# Patient Record
Sex: FEMALE | Race: WHITE | Employment: FULL TIME | URBAN - METROPOLITAN AREA
[De-identification: names, ages, dates, MRNs, and addresses within clinical notes are randomized per-mention and may not be internally consistent; named-entity substitution may affect disease eponyms.]

---

## 2017-11-06 ENCOUNTER — HOSPITAL ENCOUNTER (OUTPATIENT)
Dept: MAMMOGRAPHY | Age: 47
Discharge: HOME | End: 2017-11-06
Attending: OBSTETRICS & GYNECOLOGY
Payer: COMMERCIAL

## 2017-11-06 DIAGNOSIS — Z12.31 VISIT FOR SCREENING MAMMOGRAM: ICD-10-CM

## 2017-11-06 PROCEDURE — 77067 SCR MAMMO BI INCL CAD: CPT

## 2018-07-03 ENCOUNTER — OFFICE VISIT (OUTPATIENT)
Dept: PRIMARY CARE CLINIC | Age: 48
End: 2018-07-03

## 2018-07-03 VITALS
WEIGHT: 293 LBS | HEART RATE: 94 BPM | DIASTOLIC BLOOD PRESSURE: 90 MMHG | HEIGHT: 65 IN | RESPIRATION RATE: 17 BRPM | OXYGEN SATURATION: 94 % | TEMPERATURE: 98 F | SYSTOLIC BLOOD PRESSURE: 148 MMHG | BODY MASS INDEX: 48.82 KG/M2

## 2018-07-03 DIAGNOSIS — J01.00 ACUTE MAXILLARY SINUSITIS, RECURRENCE NOT SPECIFIED: Primary | ICD-10-CM

## 2018-07-03 RX ORDER — BISMUTH SUBSALICYLATE 262 MG
1 TABLET,CHEWABLE ORAL DAILY
COMMUNITY

## 2018-07-03 RX ORDER — FAMOTIDINE 10 MG/1
10 TABLET ORAL 2 TIMES DAILY
COMMUNITY

## 2018-07-03 RX ORDER — AMOXICILLIN AND CLAVULANATE POTASSIUM 875; 125 MG/1; MG/1
1 TABLET, FILM COATED ORAL EVERY 12 HOURS
Qty: 14 TAB | Refills: 0 | Status: SHIPPED | OUTPATIENT
Start: 2018-07-07 | End: 2018-07-14

## 2018-07-03 NOTE — PATIENT INSTRUCTIONS
Saline Nasal Washes: Care Instructions  Your Care Instructions  Saline nasal washes help keep the nasal passages open by washing out thick or dried mucus. This simple remedy can help relieve symptoms of allergies, sinusitis, and colds. It also can make the nose feel more comfortable by keeping the mucous membranes moist. You may notice a little burning sensation in your nose the first few times you use the solution, but this usually gets better in a few days. Follow-up care is a key part of your treatment and safety. Be sure to make and go to all appointments, and call your doctor if you are having problems. It's also a good idea to know your test results and keep a list of the medicines you take. How can you care for yourself at home? · You can buy premixed saline solution in a squeeze bottle or other sinus rinse products at a drugstore. Read and follow the instructions on the label. · You also can make your own saline solution by adding 1 teaspoon of salt and 1 teaspoon of baking soda to 2 cups of distilled water. · If you use a homemade solution, pour a small amount into a clean bowl. Using a rubber bulb syringe, squeeze the syringe and place the tip in the salt water. Pull a small amount of the salt water into the syringe by relaxing your hand. · Sit down with your head tilted slightly back. Do not lie down. Put the tip of the bulb syringe or the squeeze bottle a little way into one of your nostrils. Gently drip or squirt a few drops into the nostril. Repeat with the other nostril. Some sneezing and gagging are normal at first.  · Gently blow your nose. · Wipe the syringe or bottle tip clean after each use. · Repeat this 2 or 3 times a day. · Use nasal washes gently if you have nosebleeds often. When should you call for help? Watch closely for changes in your health, and be sure to contact your doctor if:  ? · You often get nosebleeds. ? · You have problems doing the nasal washes.    Where can you learn more? Go to http://smith-scooby.info/. Enter [id number] in the search box to learn more about \"Saline Nasal Washes: Care Instructions. \"  Current as of: May 12, 2017  Content Version: 11.4  © 5864-6742 [employer]. Care instructions adapted under license by Aud[employer] (which disclaims liability or warranty for this information). If you have questions about a medical condition or this instruction, always ask your healthcare professional. [address] any warranty or liability for your use of this information. Sinusitis: Care Instructions  Your Care Instructions    Sinusitis is an infection of the lining of the sinus cavities in your head. Sinusitis often follows a cold. It causes pain and pressure in your head and face. In most cases, sinusitis gets better on its own in 1 to 2 weeks. But some mild symptoms may last for several weeks. Sometimes antibiotics are needed. Follow-up care is a key part of your treatment and safety. Be sure to make and go to all appointments, and call your doctor if you are having problems. It's also a good idea to know your test results and keep a list of the medicines you take. How can you care for yourself at home? · Take an over-the-counter pain medicine, such as acetaminophen (Tylenol), ibuprofen (Advil, Motrin), or naproxen (Aleve). Read and follow all instructions on the label. · If the doctor prescribed antibiotics, take them as directed. Do not stop taking them just because you feel better. You need to take the full course of antibiotics. · Be careful when taking over-the-counter cold or flu medicines and Tylenol at the same time. Many of these medicines have acetaminophen, which is Tylenol. Read the labels to make sure that you are not taking more than the recommended dose. Too much acetaminophen (Tylenol) can be harmful.   · Breathe warm, moist air from a steamy shower, a hot bath, or a sink filled with hot water. Avoid cold, dry air. Using a humidifier in your home may help. Follow the directions for cleaning the machine. · Use saline (saltwater) nasal washes to help keep your nasal passages open and wash out mucus and bacteria. You can buy saline nose drops at a grocery store or drugstore. Or you can make your own at home by adding 1 teaspoon of salt and 1 teaspoon of baking soda to 2 cups of distilled water. If you make your own, fill a bulb syringe with the solution, insert the tip into your nostril, and squeeze gently. Lorrin Fraction your nose. · Put a hot, wet towel or a warm gel pack on your face 3 or 4 times a day for 5 to 10 minutes each time. · Try a decongestant nasal spray like oxymetazoline (Afrin). Do not use it for more than 3 days in a row. Using it for more than 3 days can make your congestion worse. When should you call for help? Call your doctor now or seek immediate medical care if:  ? · You have new or worse swelling or redness in your face or around your eyes. ? · You have a new or higher fever. ? Watch closely for changes in your health, and be sure to contact your doctor if:  ? · You have new or worse facial pain. ? · The mucus from your nose becomes thicker (like pus) or has new blood in it. ? · You are not getting better as expected. Where can you learn more? Go to http://smith-scooby.info/. Enter S588 in the search box to learn more about \"Sinusitis: Care Instructions. \"  Current as of: May 12, 2017  Content Version: 11.4  © 7132-0076 [employer]. Care instructions adapted under license by [employer] (which disclaims liability or warranty for this information). If you have questions about a medical condition or this instruction, always ask your healthcare professional. [address] any warranty or liability for your use of this information.

## 2018-07-03 NOTE — PROGRESS NOTES
Chief Complaint   Patient presents with    Nasal Congestion   pt c/o nasal congestion, post nasal drip, sinus pressure and coughing since last week, pt states she has taken otc Dayquil,Nyquil and zyrtec to help with discomfort. This note will not be viewable in [address]

## 2018-07-03 NOTE — MR AVS SNAPSHOT
Michelle Doyle 
 
 
 [address] [place] [address] 83. 
[phone] Patient: Dianne Patel MRN: [id number] :1970 Visit Information Date & Time Provider Department Dept. Phone Encounter #  
 7/3/2018  2:00 PM Margaux [facility] [id number] Upcoming Health Maintenance Date Due DTaP/Tdap/Td series (1 - Tdap) 1989 PAP AKA CERVICAL CYTOLOGY 1989 Influenza Age 5 to Adult 2018 Allergies as of 7/3/2018  Review Complete On: 7/3/2018 By: Margaux Hollingsworth MD  
 No Known Allergies Current Immunizations  Never Reviewed No immunizations on file. Not reviewed this visit You Were Diagnosed With   
  
 Codes Comments Acute maxillary sinusitis, recurrence not specified    -  Primary ICD-10-CM: J01.00 ICD-9-CM: 461.0 Vitals BP Pulse Temp Resp Height(growth percentile) Weight(growth percentile) 148/90 (BP 1 Location: Left arm, BP Patient Position: Sitting) 94 98 °F (36.7 °C) (Oral) 17 5' 5\" (1.651 m) 342 lb 3.2 oz (155.2 kg) LMP SpO2 BMI OB Status Smoking Status 2018 94% 56.95 kg/m2 Having regular periods Never Smoker Vitals History BMI and BSA Data Body Mass Index Body Surface Area 56.95 kg/m 2 2.67 m 2 Preferred Pharmacy Pharmacy Name Phone [phone] [phone] Your Updated Medication List  
  
   
This list is accurate as of 7/3/18  2:12 PM.  Always use your most recent med list.  
  
  
  
  
 amoxicillin-clavulanate 875-125 mg per tablet Commonly known as:  AUGMENTIN Take 1 Tab by mouth every twelve (12) hours for 7 days. Start taking on:  2018 CALCIUM WITH VITAMIN D PO Take 1 Tab by mouth daily. GLUCOSAMINE CHONDROITIN PLUS PO Take 1 Tab by mouth daily. multivitamin tablet Commonly known as:  ONE A DAY  
 Take 1 Tab by mouth daily. PEPCID AC 10 mg tablet Generic drug:  famotidine Take 10 mg by mouth two (2) times a day. Prescriptions Sent to Pharmacy Refills  
 amoxicillin-clavulanate (AUGMENTIN) 875-125 mg per tablet 0 Starting on: 7/7/2018 Sig: Take 1 Tab by mouth every twelve (12) hours for 7 days. Class: Normal  
 [facility] [place] [address] #: [phone] Route: Oral  
  
Patient Instructions Saline Nasal Washes: Care Instructions Your Care Instructions Saline nasal washes help keep the nasal passages open by washing out thick or dried mucus. This simple remedy can help relieve symptoms of allergies, sinusitis, and colds. It also can make the nose feel more comfortable by keeping the mucous membranes moist. You may notice a little burning sensation in your nose the first few times you use the solution, but this usually gets better in a few days. Follow-up care is a key part of your treatment and safety. Be sure to make and go to all appointments, and call your doctor if you are having problems. It's also a good idea to know your test results and keep a list of the medicines you take. How can you care for yourself at home? · You can buy premixed saline solution in a squeeze bottle or other sinus rinse products at a drugstore. Read and follow the instructions on the label. · You also can make your own saline solution by adding 1 teaspoon of salt and 1 teaspoon of baking soda to 2 cups of distilled water. · If you use a homemade solution, pour a small amount into a clean bowl. Using a rubber bulb syringe, squeeze the syringe and place the tip in the salt water. Pull a small amount of the salt water into the syringe by relaxing your hand. · Sit down with your head tilted slightly back. Do not lie down.  Put the tip of the bulb syringe or the squeeze bottle a little way into one of your nostrils. Gently drip or squirt a few drops into the nostril. Repeat with the other nostril. Some sneezing and gagging are normal at first. 
· Gently blow your nose. · Wipe the syringe or bottle tip clean after each use. · Repeat this 2 or 3 times a day. · Use nasal washes gently if you have nosebleeds often. When should you call for help? Watch closely for changes in your health, and be sure to contact your doctor if: 
? · You often get nosebleeds. ? · You have problems doing the nasal washes. Where can you learn more? Go to http://smith-scooby.info/. Enter [id number] in the search box to learn more about \"Saline Nasal Washes: Care Instructions. \" Current as of: May 12, 2017 Content Version: 11.4 © 3573-5395 [employer]. Care instructions adapted under license by Net[employer] (which disclaims liability or warranty for this information). If you have questions about a medical condition or this instruction, always ask your healthcare professional. [address] any warranty or liability for your use of this information. Sinusitis: Care Instructions Your Care Instructions Sinusitis is an infection of the lining of the sinus cavities in your head. Sinusitis often follows a cold. It causes pain and pressure in your head and face. In most cases, sinusitis gets better on its own in 1 to 2 weeks. But some mild symptoms may last for several weeks. Sometimes antibiotics are needed. Follow-up care is a key part of your treatment and safety. Be sure to make and go to all appointments, and call your doctor if you are having problems. It's also a good idea to know your test results and keep a list of the medicines you take. How can you care for yourself at home? · Take an over-the-counter pain medicine, such as acetaminophen (Tylenol), ibuprofen (Advil, Motrin), or naproxen (Aleve). Read and follow all instructions on the label. · If the doctor prescribed antibiotics, take them as directed. Do not stop taking them just because you feel better. You need to take the full course of antibiotics. · Be careful when taking over-the-counter cold or flu medicines and Tylenol at the same time. Many of these medicines have acetaminophen, which is Tylenol. Read the labels to make sure that you are not taking more than the recommended dose. Too much acetaminophen (Tylenol) can be harmful. · Breathe warm, moist air from a steamy shower, a hot bath, or a sink filled with hot water. Avoid cold, dry air. Using a humidifier in your home may help. Follow the directions for cleaning the machine. · Use saline (saltwater) nasal washes to help keep your nasal passages open and wash out mucus and bacteria. You can buy saline nose drops at a grocery store or drugstore. Or you can make your own at home by adding 1 teaspoon of salt and 1 teaspoon of baking soda to 2 cups of distilled water. If you make your own, fill a bulb syringe with the solution, insert the tip into your nostril, and squeeze gently. [place] your nose. · Put a hot, wet towel or a warm gel pack on your face 3 or 4 times a day for 5 to 10 minutes each time. · Try a decongestant nasal spray like oxymetazoline (Afrin). Do not use it for more than 3 days in a row. Using it for more than 3 days can make your congestion worse. When should you call for help? Call your doctor now or seek immediate medical care if: 
? · You have new or worse swelling or redness in your face or around your eyes. ? · You have a new or higher fever. ? Watch closely for changes in your health, and be sure to contact your doctor if: 
? · You have new or worse facial pain. ? · The mucus from your nose becomes thicker (like pus) or has new blood in it. ? · You are not getting better as expected. Where can you learn more? Go to http://smith-scooby.info/. Enter O895 in the search box to learn more about \"Sinusitis: Care Instructions. \" Current as of: May 12, 2017 Content Version: 11.4 © 8979-4266 [employer]. Care instructions adapted under license by Mob[employer] (which disclaims liability or warranty for this information). If you have questions about a medical condition or this instruction, always ask your healthcare professional. [address] any warranty or liability for your use of this information. Introducing [address]! Dear [employer]: Thank you for requesting a [employer] account. Our records indicate that you already have an active [employer] account. You can access your account anytime at [email or web address] Did you know that you can access your hospital and ER discharge instructions at any time in [employer]? You can also review all of your test results from your hospital stay or ER visit. Additional Information If you have questions, please visit the Frequently Asked Questions section of the [employer] website at [email or web address] Remember, [employer] is NOT to be used for urgent needs. For medical emergencies, dial 911. Now available from your iPhone and Android! Please provide this summary of care documentation to your next provider. Your primary care clinician is listed as [address] [place] [address]. If you have any questions after today's visit, please call [phone].

## 2018-07-03 NOTE — PROGRESS NOTES
Subjective:      Bhanu Billings is a 50 y.o. female who presents for evaluation of possible sinus infection. Nasal congestion and sinus pressure x4 days. No fever or chills. Cloudy nasal drainage, cough is mildly productive. Scratchy throat and bilateral ear pressure. She has tried dayquil, nyquil, zyrtec, afrin nasal spray. History reviewed. No pertinent past medical history. Family History   Problem Relation Age of Onset    Breast Cancer Mother 67       No Known Allergies  Social History     Social History    Marital status:      Spouse name: N/A    Number of children: N/A    Years of education: N/A     Occupational History    Not on file. Social History Main Topics    Smoking status: Never Smoker    Smokeless tobacco: Never Used    Alcohol use No    Drug use: No    Sexual activity: Yes     Other Topics Concern    Not on file     Social History Narrative       Review of Systems   Constitutional: Negative for chills and fever. HENT: Positive for congestion and sinus pain. Negative for ear pain and sore throat. Eyes: Negative for blurred vision, double vision, photophobia and pain. Respiratory: Positive for cough. Negative for shortness of breath and stridor. Cardiovascular: Negative for chest pain. Gastrointestinal: Negative for abdominal pain, diarrhea, nausea and vomiting. Skin: Negative for rash. Neurological: Negative for tingling, sensory change, focal weakness and headaches. Objective:     Visit Vitals    /90 (BP 1 Location: Left arm, BP Patient Position: Sitting)    Pulse 94    Temp 98 °F (36.7 °C) (Oral)    Resp 17    Ht 5' 5\" (1.651 m)    Wt 342 lb 3.2 oz (155.2 kg)    LMP 06/30/2018    SpO2 94%    BMI 56.95 kg/m2     General: Alert and oriented and in no acute distress. Responds to all questions appropriately. SKIN: No rash. HEAD: bilaterally maxillary sinus tenderness.   EYES: Sclera and conjunctiva clear; pupils round and reactive to light.  EARS: External normal, canals clear, tympanic membranes normal.     NOSE: Edema and erythema of the turbinates with clear mucous drainage. OROPHARYNX: Slight tonsil edema and erythema with no exudate. NECK: Supple; no masses; normal lymphadenopathy. LUNGS: Clear to auscultation bilaterally, no wheeze, rales and rhonchi. CARDIOVASCULAR: Regular, rate, and rhythm without murmurs, gallops or rubs. NEUROLOGIC: Speech intact; face symmetrical; moves all extremities equally. Assessment:       ICD-10-CM ICD-9-CM    1. Acute maxillary sinusitis, recurrence not specified J01.00 461.0 amoxicillin-clavulanate (AUGMENTIN) 875-125 mg per tablet     Likely viral. Advised to continue OTCs, if febrile or not improving in 3-4 days, may start augmentin. Return PRN. Plan:     1. Nasal saline rinses as needed for congestion. 2. Follow-up  in 7 days  if symptoms worsen or persist.  3. Over-the-counter mediciations:    1. Ibuprofen (Motrin, Advil): 200mg - take 1-4 three times a day for 5 days. -OR-    Naproxin (Aleve): 220mg 1-2 tablets twice a day for 5 days. 2. Acetaminophen (Tylenol): 500mg 1-2 tablets every 6 hours as needed for pain. 3. Combination cough and decongestant medicine such as Mucinex D.  4. Theraflu  5. Augmentin     This note will not be viewable in MyChart.

## 2018-08-27 ENCOUNTER — OFFICE VISIT (OUTPATIENT)
Dept: SURGERY | Age: 48
End: 2018-08-27

## 2018-08-27 VITALS
DIASTOLIC BLOOD PRESSURE: 100 MMHG | HEIGHT: 65 IN | HEART RATE: 94 BPM | BODY MASS INDEX: 48.82 KG/M2 | WEIGHT: 293 LBS | SYSTOLIC BLOOD PRESSURE: 175 MMHG

## 2018-08-27 DIAGNOSIS — Z15.02 MONOALLELIC MUTATION OF CHEK2 GENE IN FEMALE PATIENT: Primary | ICD-10-CM

## 2018-08-27 DIAGNOSIS — Z15.09 MONOALLELIC MUTATION OF CHEK2 GENE IN FEMALE PATIENT: Primary | ICD-10-CM

## 2018-08-27 DIAGNOSIS — Z80.41 FAMILY HISTORY OF OVARIAN CANCER: ICD-10-CM

## 2018-08-27 DIAGNOSIS — Z91.89 AT HIGH RISK FOR BREAST CANCER: ICD-10-CM

## 2018-08-27 DIAGNOSIS — Z80.3 FAMILY HISTORY OF BREAST CANCER: ICD-10-CM

## 2018-08-27 DIAGNOSIS — Z15.01 MONOALLELIC MUTATION OF CHEK2 GENE IN FEMALE PATIENT: Primary | ICD-10-CM

## 2018-08-27 DIAGNOSIS — Z15.89 MONOALLELIC MUTATION OF CHEK2 GENE IN FEMALE PATIENT: Primary | ICD-10-CM

## 2018-08-27 PROBLEM — E66.01 OBESITY, MORBID (HCC): Status: ACTIVE | Noted: 2018-08-27

## 2018-08-27 NOTE — PROGRESS NOTES
HISTORY OF PRESENT ILLNESS  Saintclair Lemons is a 50 y.o. female. HPI  NEW patient consult referred by Dr. Saulo Forbes for CHEK2 mutation. Patient here to talk about care plan for genetic mutation and high risk family history. Denies palpable lumps, skin changes, or nipple discharge/retraction. No pain. Patient has a history of melanoma at age 30. FH:  Mother had breast cancer at 79. Maternal grandmother  from ovarian cancer at 48. Review of Systems   Constitutional: Negative. HENT: Negative. Eyes: Negative. Respiratory: Negative. Cardiovascular: Negative. Gastrointestinal: Negative. Genitourinary: Negative. Musculoskeletal: Negative. Skin: Negative. Neurological: Negative. Endo/Heme/Allergies: Negative. Psychiatric/Behavioral: Negative.         Physical Exam    ASSESSMENT and PLAN  {ASSESSMENT/PLAN:03559}

## 2018-08-27 NOTE — COMMUNICATION BODY
HISTORY OF PRESENT ILLNESS  Seema Calero is a 50 y.o. female. HPI  NEW patient consult referred by Dr. Toby Tristan for CHEK2 mutation. Patient here to talk about care plan for genetic mutation and high risk family history. Denies palpable lumps, skin changes, or nipple discharge/retraction. No pain.      Patient has a history of melanoma at age 30.      FH: Mother had breast cancer at 79. Maternal grandmother  from ovarian cancer at 48. Past Medical History:   Diagnosis Date    Cancer [facility])     melanoma       No past surgical history on file. Social History     Social History    Marital status:      Spouse name: N/A    Number of children: N/A    Years of education: N/A     Occupational History    Not on file. Social History Main Topics    Smoking status: Never Smoker    Smokeless tobacco: Never Used    Alcohol use 1.8 oz/week     2 Glasses of wine, 1 Cans of beer per week    Drug use: No    Sexual activity: Yes     Other Topics Concern    Not on file     Social History Narrative       Current Outpatient Prescriptions on File Prior to Visit   Medication Sig Dispense Refill    multivitamin (ONE A DAY) tablet Take 1 Tab by mouth daily.  famotidine (PEPCID AC) 10 mg tablet Take 10 mg by mouth two (2) times a day.  calcium carbonate/vitamin D3 (CALCIUM WITH VITAMIN D PO) Take 1 Tab by mouth daily.  gluc/chnd/om3/dha/epa/fish/str (GLUCOSAMINE CHONDROITIN PLUS PO) Take 1 Tab by mouth daily. No current facility-administered medications on file prior to visit. No Known Allergies    OB History      Para Term  AB Living    1         SAB TAB Ectopic Molar Multiple Live Births        1 2        Obstetric Comments    Menarche 13, LMP 16, # of children 2, age of 4st delivery 27, Hysterectomy/oophorectomy no/no, Breast bx no, history of breast feeding yes, BCP no, Hormone therapy no        ROS  Constitutional: Negative. HENT: Negative. Eyes: Negative. Respiratory: Negative. Cardiovascular: Negative. Gastrointestinal: Negative. Genitourinary: Negative. Musculoskeletal: Negative. Skin: Negative. Neurological: Negative. Endo/Heme/Allergies: Negative. Psychiatric/Behavioral: Negative. Physical Exam   Cardiovascular: Normal rate, regular rhythm and normal heart sounds. Pulmonary/Chest: Breath sounds normal. Right breast exhibits no inverted nipple, no mass, no nipple discharge, no skin change and no tenderness. Left breast exhibits no inverted nipple, no mass, no nipple discharge, no skin change and no tenderness. Breasts are symmetrical.   Lymphadenopathy:        Right cervical: No superficial cervical, no deep cervical and no posterior cervical adenopathy present. Left cervical: No superficial cervical, no deep cervical and no posterior cervical adenopathy present. She has no axillary adenopathy. Right axillary: No pectoral and no lateral adenopathy present. Left axillary: No pectoral and no lateral adenopathy present. BREAST ULTRASOUND  Indication: Right  breast mass LOQ  Technique: The area was scanned using a high-frequency linear-array near-field transducer  Findings: No abnormal mass, lesion, or shadowing noted. No cysts  Impression: Normal breast tissue   Disposition: No worrisome finding on ultrasound    ASSESSMENT and PLAN    ICD-10-CM ICD-9-CM    1. Monoallelic mutation of CHEK2 gene in female patient Z15.01 V84.01     Z15.89 V84.09     Z15.09 V84.89     Z15.02 V84.02    2. At high risk for breast cancer Z91.89 V49.89    3. Family history of breast cancer Z80.3 V16.3    4. Family history of ovarian cancer Z80.41 V13.42       New patient presents for consultation about CHEK2 mutation and high risk family history. Thick area on exam at RIGHT breast LOQ. No worrisome findings on US.     Discussed CHEK2 mutation and family hx of breast cancer, and elevated risk of breast cancer at approx. 30-35%. Options include observation with yearly MRI and mammo imaging, drug treatment, or prophylactic BL mastectomy. Also at slightly increased risk for ovarian cancer - pt to discuss pelvic US with her OB-GYN provider. Assured pt that she does not need to have oophorectomy or hysterectomy at this time. Also discussed importance of healthy diet with fruits and vegetables, and exercise 3 hours/week to help reduce risk of breast cancer. Pt also notes that she plans to stagger her imaging and provider appointments every 3 months. No action recommended for pt's 24year-old daughters at this time, except for normal surveillance. Will order screening MRI and f/u with the results. F/U in 1 year. This plan was reviewed with the patient and patient agrees. All questions were answered.     Written by Wade Ogden, as dictated by Dr. Luis M Posada MD.

## 2018-08-27 NOTE — LETTER
2018 2:56 PM 
 
Patient:  Mago Barlow YOB: 1970 Date of Visit: 2018 Dear Dr. Khalida Jovel: Thank you for referring Ms. Myrna Teresa to me for evaluation/treatment. Below are the relevant portions of my assessment and plan of care. HISTORY OF PRESENT ILLNESS Mago Barlow is a 50 y.o. female. HPI 
NEW patient consult referred by Dr. Susana Bond for CHEK2 mutation. Patient here to talk about care plan for genetic mutation and high risk family history. Denies palpable lumps, skin changes, or nipple discharge/retraction. No pain.  
  
Patient has a history of melanoma at age 30.  
  
FH: Mother had breast cancer at 79. Maternal grandmother  from ovarian cancer at 48. Past Medical History:  
Diagnosis Date  Cancer ([facility] [address] melanoma No past surgical history on file. Social History Social History  Marital status:  Spouse name: N/A  
 Number of children: N/A  
 Years of education: N/A Occupational History  Not on file. Social History Main Topics  Smoking status: Never Smoker  Smokeless tobacco: Never Used  Alcohol use 1.8 oz/week 2 Glasses of wine, 1 Cans of beer per week  Drug use: No  
 Sexual activity: Yes Other Topics Concern  Not on file Social History Narrative Current Outpatient Prescriptions on File Prior to Visit Medication Sig Dispense Refill  multivitamin (ONE A DAY) tablet Take 1 Tab by mouth daily.  famotidine (PEPCID AC) 10 mg tablet Take 10 mg by mouth two (2) times a day.  calcium carbonate/vitamin D3 (CALCIUM WITH VITAMIN D PO) Take 1 Tab by mouth daily.  gluc/chnd/om3/dha/epa/fish/str (GLUCOSAMINE CHONDROITIN PLUS PO) Take 1 Tab by mouth daily. No current facility-administered medications on file prior to visit. No Known Allergies OB History  Para Term  AB Living  1       
 SAB TAB Ectopic Molar Multiple Live Births 1 2 Obstetric Comments Menarche 13, LMP 8/1/18, # of children 2, age of 1st delivery 27, Hysterectomy/oophorectomy no/no, Breast bx no, history of breast feeding yes, BCP no, Hormone therapy no ROS Constitutional: Negative. HENT: Negative. Eyes: Negative. Respiratory: Negative. Cardiovascular: Negative. Gastrointestinal: Negative. Genitourinary: Negative. Musculoskeletal: Negative. Skin: Negative. Neurological: Negative. Endo/Heme/Allergies: Negative. Psychiatric/Behavioral: Negative. Physical Exam  
Cardiovascular: Normal rate, regular rhythm and normal heart sounds. Pulmonary/Chest: Breath sounds normal. Right breast exhibits no inverted nipple, no mass, no nipple discharge, no skin change and no tenderness. Left breast exhibits no inverted nipple, no mass, no nipple discharge, no skin change and no tenderness. Breasts are symmetrical.  
Lymphadenopathy:  
     Right cervical: No superficial cervical, no deep cervical and no posterior cervical adenopathy present. Left cervical: No superficial cervical, no deep cervical and no posterior cervical adenopathy present. She has no axillary adenopathy. Right axillary: No pectoral and no lateral adenopathy present. Left axillary: No pectoral and no lateral adenopathy present. BREAST ULTRASOUND Indication: Right  breast mass LOQ Technique: The area was scanned using a high-frequency linear-array near-field transducer Findings: No abnormal mass, lesion, or shadowing noted. No cysts Impression: Normal breast tissue Disposition: No worrisome finding on ultrasound ASSESSMENT and PLAN 
  ICD-10-CM ICD-9-CM 1. Monoallelic mutation of CHEK2 gene in female patient Z15.01 V84.01   
 Z15.89 V84.09   
 Z15.09 V84.89   
 Z15.02 V84.02   
2. At high risk for breast cancer Z91.89 V49.89   
3. Family history of breast cancer Z80.3 V16.3 4. Family history of ovarian cancer Z80.41 V16.41 New patient presents for consultation about CHEK2 mutation and high risk family history. Thick area on exam at RIGHT breast LOQ. No worrisome findings on US. Discussed CHEK2 mutation and family hx of breast cancer, and elevated risk of breast cancer at approx. 30-35%. Options include observation with yearly MRI and mammo imaging, drug treatment, or prophylactic BL mastectomy. Also at slightly increased risk for ovarian cancer - pt to discuss pelvic US with her OB-GYN provider. Assured pt that she does not need to have oophorectomy or hysterectomy at this time. Also discussed importance of healthy diet with fruits and vegetables, and exercise 3 hours/week to help reduce risk of breast cancer. Pt also notes that she plans to stagger her imaging and provider appointments every 3 months. No action recommended for pt's 24year-old daughters at this time, except for normal surveillance. Will order screening MRI and f/u with the results. F/U in 1 year. This plan was reviewed with the patient and patient agrees. All questions were answered. Written by Catrachito Reyes, as dictated by Dr. Erlinda Stratton MD. 
 
 
If you have questions, please do not hesitate to call me. I look forward to following Ms. Silveira along with you.  
 
 
 
Sincerely, 
 
 
Sixto Escoto MD

## 2018-08-27 NOTE — MR AVS SNAPSHOT
Nila Irvin 
 
 
 [facility] [address] [phone] Patient: Zilphia Mohs MRN: [id number] :1970 Visit Information Date & Time Provider Department Dept. Phone Encounter #  
 2018 74:66 AM Beulah Carlson MD [facility] [phone] [id number] Follow-up Instructions Return in about 1 year (around 2019) for with mammogram.  
 Follow-up and Disposition History Upcoming Health Maintenance Date Due DTaP/Tdap/Td series (1 - Tdap) 1989 PAP AKA CERVICAL CYTOLOGY 1989 Influenza Age 5 to Adult 2018 Allergies as of 2018  Review Complete On:  By: Beulah Carlson MD  
 No Known Allergies Current Immunizations  Never Reviewed No immunizations on file. Not reviewed this visit You Were Diagnosed With   
  
 Codes Comments Monoallelic mutation of CHEK2 gene in female patient    -  Primary ICD-10-CM: Z15.01, Z15.89, Z15.09, Z15.02 
ICD-9-CM: V84.01, V84.09, V84.89, V84.02 At high risk for breast cancer     ICD-10-CM: Z91.89 ICD-9-CM: V49.89 Family history of breast cancer     ICD-10-CM: Z80.3 ICD-9-CM: V16.3 Family history of ovarian cancer     ICD-10-CM: Z80.41 ICD-9-CM: V16.41 Vitals BP Pulse Height(growth percentile) Weight(growth percentile) BMI OB Status (!) 175/100 94 5' 5\" (1.651 m) 327 lb (148.3 kg) 54.42 kg/m2 Having regular periods Smoking Status Never Smoker BMI and BSA Data Body Mass Index Body Surface Area 54.42 kg/m 2 2.61 m 2 Preferred Pharmacy Pharmacy Name Phone [id number] [phone] Your Updated Medication List  
  
   
This list is accurate as of 18  4:03 PM.  Always use your most recent med list.  
  
  
  
  
 CALCIUM WITH VITAMIN D PO  
 Take 1 Tab by mouth daily. GLUCOSAMINE CHONDROITIN PLUS PO Take 1 Tab by mouth daily. multivitamin tablet Commonly known as:  ONE A DAY Take 1 Tab by mouth daily. PEPCID AC 10 mg tablet Generic drug:  famotidine Take 10 mg by mouth two (2) times a day. Follow-up Instructions Return in about 1 year (around 8/27/2019) for with mammogram.  
  
To-Do List   
 08/27/2018 Imaging:  MRI BREAST BI W WO CONT Patient Instructions Breast Self-Exam: Care Instructions Your Care Instructions A breast self-exam is when you check your breasts for lumps or changes. This regular exam helps you learn how your breasts normally look and feel. Most breast problems or changes are not because of cancer. Breast self-exam is not a substitute for a mammogram. Having regular breast exams by your doctor and regular mammograms improve your chances of finding any problems with your breasts. Some women set a time each month to do a step-by-step breast self-exam. Other women like a less formal system. They might look at their breasts as they brush their teeth, or feel their breasts once in a while in the shower. If you notice a change in your breast, tell your doctor. Follow-up care is a key part of your treatment and safety. Be sure to make and go to all appointments, and call your doctor if you are having problems. It's also a good idea to know your test results and keep a list of the medicines you take. How do you do a breast self-exam? 
· The best time to examine your breasts is usually one week after your menstrual period begins. Your breasts should not be tender then. If you do not have periods, you might do your exam on a day of the month that is easy to remember. · To examine your breasts: ¨ Remove all your clothes above the waist and lie down.  When you are lying down, your breast tissue spreads evenly over your chest wall, which makes it easier to feel all your breast tissue. ¨ Use the pads-not the fingertips-of the 3 middle fingers of your left hand to check your right breast. Move your fingers slowly in small coin-sized circles that overlap. ¨ Use three levels of pressure to feel of all your breast tissue. Use light pressure to feel the tissue close to the skin surface. Use medium pressure to feel a little deeper. Use firm pressure to feel your tissue close to your breastbone and ribs. Use each pressure level to feel your breast tissue before moving on to the next spot. ¨ Check your entire breast, moving up and down as if following a strip from the collarbone to the bra line, and from the armpit to the ribs. Repeat until you have covered the entire breast. 
¨ Repeat this procedure for your left breast, using the pads of the 3 middle fingers of your right hand. · To examine your breasts while in the shower: 
¨ Place one arm over your head and lightly soap your breast on that side. ¨ Using the pads of your fingers, gently move your hand over your breast (in the strip pattern described above), feeling carefully for any lumps or changes. ¨ Repeat for the other breast. 
· Have your doctor inspect anything you notice to see if you need further testing. Where can you learn more? Go to http://smith-scooby.info/. Enter P148 in the search box to learn more about \"Breast Self-Exam: Care Instructions. \" Current as of: May 12, 2017 Content Version: 11.7 © 2480-2130 [employer]. Care instructions adapted under license by [employer] (which disclaims liability or warranty for this information). If you have questions about a medical condition or this instruction, always ask your healthcare professional. [place] [address] any warranty or liability for your use of this information. Introducing [facility] & HEALTH SERVICES! Dear Clotilde Hawthorne: Thank you for requesting a [employer] account. Our records indicate that you already have an active [employer] account. You can access your account anytime at [email or web address] Did you know that you can access your hospital and ER discharge instructions at any time in [employer]? You can also review all of your test results from your hospital stay or ER visit. Additional Information If you have questions, please visit the Frequently Asked Questions section of the [employer] website at [email or web address] Remember, [employer] is NOT to be used for urgent needs. For medical emergencies, dial 911. Now available from your iPhone and Android! Please provide this summary of care documentation to your next provider. Your primary care clinician is listed as [address] [place] [address]. If you have any questions after today's visit, please call [phone].

## 2018-08-27 NOTE — PATIENT INSTRUCTIONS
Breast Self-Exam: Care Instructions  Your Care Instructions    A breast self-exam is when you check your breasts for lumps or changes. This regular exam helps you learn how your breasts normally look and feel. Most breast problems or changes are not because of cancer. Breast self-exam is not a substitute for a mammogram. Having regular breast exams by your doctor and regular mammograms improve your chances of finding any problems with your breasts. Some women set a time each month to do a step-by-step breast self-exam. Other women like a less formal system. They might look at their breasts as they brush their teeth, or feel their breasts once in a while in the shower. If you notice a change in your breast, tell your doctor. Follow-up care is a key part of your treatment and safety. Be sure to make and go to all appointments, and call your doctor if you are having problems. It's also a good idea to know your test results and keep a list of the medicines you take. How do you do a breast self-exam?  · The best time to examine your breasts is usually one week after your menstrual period begins. Your breasts should not be tender then. If you do not have periods, you might do your exam on a day of the month that is easy to remember. · To examine your breasts:  ¨ Remove all your clothes above the waist and lie down. When you are lying down, your breast tissue spreads evenly over your chest wall, which makes it easier to feel all your breast tissue. ¨ Use the pads-not the fingertips-of the 3 middle fingers of your left hand to check your right breast. Move your fingers slowly in small coin-sized circles that overlap. ¨ Use three levels of pressure to feel of all your breast tissue. Use light pressure to feel the tissue close to the skin surface. Use medium pressure to feel a little deeper. Use firm pressure to feel your tissue close to your breastbone and ribs.  Use each pressure level to feel your breast tissue before moving on to the next spot. ¨ Check your entire breast, moving up and down as if following a strip from the collarbone to the bra line, and from the armpit to the ribs. Repeat until you have covered the entire breast.  ¨ Repeat this procedure for your left breast, using the pads of the 3 middle fingers of your right hand. · To examine your breasts while in the shower:  ¨ Place one arm over your head and lightly soap your breast on that side. ¨ Using the pads of your fingers, gently move your hand over your breast (in the strip pattern described above), feeling carefully for any lumps or changes. ¨ Repeat for the other breast.  · Have your doctor inspect anything you notice to see if you need further testing. Where can you learn more? Go to http://smith-scooby.info/. Enter P148 in the search box to learn more about \"Breast Self-Exam: Care Instructions. \"  Current as of: May 12, 2017  Content Version: 11.7  © 5574-4850 [employer]. Care instructions adapted under license by [employer] (which disclaims liability or warranty for this information). If you have questions about a medical condition or this instruction, always ask your healthcare professional. [place] [address] any warranty or liability for your use of this information.

## 2018-08-27 NOTE — PROGRESS NOTES
HISTORY OF PRESENT ILLNESS  Rosalia Nguyen is a 50 y.o. female. HPI  NEW patient consult referred by Dr. Shahida Liriano for CHEK2 mutation. Patient here to talk about care plan for genetic mutation and high risk family history. Denies palpable lumps, skin changes, or nipple discharge/retraction. No pain.      Patient has a history of melanoma at age 30.      FH: Mother had breast cancer at 79. Maternal grandmother  from ovarian cancer at 48. Past Medical History:   Diagnosis Date    Cancer [facility])     melanoma       No past surgical history on file. Social History     Social History    Marital status:      Spouse name: N/A    Number of children: N/A    Years of education: N/A     Occupational History    Not on file. Social History Main Topics    Smoking status: Never Smoker    Smokeless tobacco: Never Used    Alcohol use 1.8 oz/week     2 Glasses of wine, 1 Cans of beer per week    Drug use: No    Sexual activity: Yes     Other Topics Concern    Not on file     Social History Narrative       Current Outpatient Prescriptions on File Prior to Visit   Medication Sig Dispense Refill    multivitamin (ONE A DAY) tablet Take 1 Tab by mouth daily.  famotidine (PEPCID AC) 10 mg tablet Take 10 mg by mouth two (2) times a day.  calcium carbonate/vitamin D3 (CALCIUM WITH VITAMIN D PO) Take 1 Tab by mouth daily.  gluc/chnd/om3/dha/epa/fish/str (GLUCOSAMINE CHONDROITIN PLUS PO) Take 1 Tab by mouth daily. No current facility-administered medications on file prior to visit. No Known Allergies    OB History      Para Term  AB Living    1         SAB TAB Ectopic Molar Multiple Live Births        1 2        Obstetric Comments    Menarche 13, LMP 16, # of children 2, age of 4st delivery 27, Hysterectomy/oophorectomy no/no, Breast bx no, history of breast feeding yes, BCP no, Hormone therapy no        ROS  Constitutional: Negative. HENT: Negative. Eyes: Negative. Respiratory: Negative. Cardiovascular: Negative. Gastrointestinal: Negative. Genitourinary: Negative. Musculoskeletal: Negative. Skin: Negative. Neurological: Negative. Endo/Heme/Allergies: Negative. Psychiatric/Behavioral: Negative. Physical Exam   Cardiovascular: Normal rate, regular rhythm and normal heart sounds. Pulmonary/Chest: Breath sounds normal. Right breast exhibits no inverted nipple, no mass, no nipple discharge, no skin change and no tenderness. Left breast exhibits no inverted nipple, no mass, no nipple discharge, no skin change and no tenderness. Breasts are symmetrical.   Lymphadenopathy:        Right cervical: No superficial cervical, no deep cervical and no posterior cervical adenopathy present. Left cervical: No superficial cervical, no deep cervical and no posterior cervical adenopathy present. She has no axillary adenopathy. Right axillary: No pectoral and no lateral adenopathy present. Left axillary: No pectoral and no lateral adenopathy present. BREAST ULTRASOUND  Indication: Right  breast mass LOQ  Technique: The area was scanned using a high-frequency linear-array near-field transducer  Findings: No abnormal mass, lesion, or shadowing noted. No cysts  Impression: Normal breast tissue   Disposition: No worrisome finding on ultrasound    ASSESSMENT and PLAN    ICD-10-CM ICD-9-CM    1. Monoallelic mutation of CHEK2 gene in female patient Z15.01 V84.01     Z15.89 V84.09     Z15.09 V84.89     Z15.02 V84.02    2. At high risk for breast cancer Z91.89 V49.89    3. Family history of breast cancer Z80.3 V16.3    4. Family history of ovarian cancer Z80.41 V13.42       New patient presents for consultation about CHEK2 mutation and high risk family history. Thick area on exam at RIGHT breast LOQ. No worrisome findings on US.     Discussed CHEK2 mutation and family hx of breast cancer, and elevated risk of breast cancer at approx. 30-35%. Options include observation with yearly MRI and mammo imaging, drug treatment, or prophylactic BL mastectomy. Also at slightly increased risk for ovarian cancer - pt to discuss pelvic US with her OB-GYN provider. Assured pt that she does not need to have oophorectomy or hysterectomy at this time. Also discussed importance of healthy diet with fruits and vegetables, and exercise 3 hours/week to help reduce risk of breast cancer. Pt also notes that she plans to stagger her imaging and provider appointments every 3 months. No action recommended for pt's 24year-old daughters at this time, except for normal surveillance. Will order screening MRI and f/u with the results. F/U in 1 year. This plan was reviewed with the patient and patient agrees. All questions were answered.     Written by Mark Kaiser, as dictated by Dr. Epifanio Cuello MD.

## 2018-12-24 ENCOUNTER — OFFICE VISIT (OUTPATIENT)
Dept: PRIMARY CARE CLINIC | Age: 48
End: 2018-12-24

## 2018-12-24 VITALS
WEIGHT: 293 LBS | HEART RATE: 85 BPM | DIASTOLIC BLOOD PRESSURE: 83 MMHG | RESPIRATION RATE: 16 BRPM | OXYGEN SATURATION: 96 % | SYSTOLIC BLOOD PRESSURE: 135 MMHG | HEIGHT: 65 IN | TEMPERATURE: 97.8 F | BODY MASS INDEX: 48.82 KG/M2

## 2018-12-24 DIAGNOSIS — R05.9 COUGH: Primary | ICD-10-CM

## 2018-12-24 DIAGNOSIS — J06.9 VIRAL UPPER RESPIRATORY TRACT INFECTION: ICD-10-CM

## 2018-12-24 DIAGNOSIS — J11.00 INFLUENZA AND PNEUMONIA: ICD-10-CM

## 2018-12-24 DIAGNOSIS — R09.81 CHRONIC NASAL CONGESTION: ICD-10-CM

## 2018-12-24 RX ORDER — AZITHROMYCIN 250 MG/1
250 TABLET, FILM COATED ORAL SEE ADMIN INSTRUCTIONS
Qty: 6 TAB | Refills: 0 | Status: SHIPPED | OUTPATIENT
Start: 2018-12-24 | End: 2018-12-29

## 2018-12-24 RX ORDER — BENZONATATE 200 MG/1
200 CAPSULE ORAL
Qty: 21 CAP | Refills: 0 | Status: SHIPPED | OUTPATIENT
Start: 2018-12-24 | End: 2018-12-31

## 2018-12-24 NOTE — PROGRESS NOTES
Chief Complaint   Patient presents with    Cough     Cough, post nasal drainage with bilateral ear congestion, noted throat burning last pm

## 2018-12-24 NOTE — PATIENT INSTRUCTIONS
Cough: Care Instructions  Your Care Instructions    A cough is your body's response to something that bothers your throat or airways. Many things can cause a cough. You might cough because of a cold or the flu, bronchitis, or asthma. Smoking, postnasal drip, allergies, and stomach acid that backs up into your throat also can cause coughs. A cough is a symptom, not a disease. Most coughs stop when the cause, such as a cold, goes away. You can take a few steps at home to cough less and feel better. Follow-up care is a key part of your treatment and safety. Be sure to make and go to all appointments, and call your doctor if you are having problems. It's also a good idea to know your test results and keep a list of the medicines you take. How can you care for yourself at home? · Drink lots of water and other fluids. This helps thin the mucus and soothes a dry or sore throat. Honey or lemon juice in hot water or tea may ease a dry cough. · Take cough medicine as directed by your doctor. · Prop up your head on pillows to help you breathe and ease a dry cough. · Try cough drops to soothe a dry or sore throat. Cough drops don't stop a cough. Medicine-flavored cough drops are no better than candy-flavored drops or hard candy. · Do not smoke. Avoid secondhand smoke. If you need help quitting, talk to your doctor about stop-smoking programs and medicines. These can increase your chances of quitting for good. When should you call for help? Call 911 anytime you think you may need emergency care.  For example, call if:    · You have severe trouble breathing.    Call your doctor now or seek immediate medical care if:    · You cough up blood.     · You have new or worse trouble breathing.     · You have a new or higher fever.     · You have a new rash.    Watch closely for changes in your health, and be sure to contact your doctor if:    · You cough more deeply or more often, especially if you notice more mucus or a change in the color of your mucus.     · You have new symptoms, such as a sore throat, an earache, or sinus pain.     · You do not get better as expected. Where can you learn more? Go to http://smith-scooby.info/. Enter D279 in the search box to learn more about \"Cough: Care Instructions. \"  Current as of: December 6, 2017  Content Version: 11.8  © 9548-9927 [employer]. Care instructions adapted under license by [employer] (which disclaims liability or warranty for this information). If you have questions about a medical condition or this instruction, always ask your healthcare professional. [address] any warranty or liability for your use of this information.

## 2018-12-24 NOTE — PROGRESS NOTES
`Subjective:   Guicho Heath is an 50 y.o. female who presents for evaluation of cough, fever, possible pneumonia. Patient describes symptoms as denies wheezing, dyspnea or hemoptysis, fever to 100.3; rapid onset, chills without rigors, fatigue, malaise. Sputum is scant. Symptoms began 1 weeks ago and are unchanged since that time. Patient denies cough. Treatment thus far includes none Past pulmonary history is significant for no history of pneumonia or bronchitis      Review of Systems  A comprehensive review of systems was negative except for that written in the HPI. Objective:     Visit Vitals  /83   Pulse 85   Temp 97.8 °F (36.6 °C) (Oral)   Resp 16   Ht 5' 5\" (1.651 m)   Wt 321 lb 12.8 oz (146 kg)   LMP 12/08/2018   SpO2 96%   BMI 53.55 kg/m²       Oxygen saturation on room air is 100%  Physical Exam:  General appearance: alert, cooperative, no distress, appears stated age  Head: Normocephalic, without obvious abnormality, atraumatic  Eyes: conjunctivae/corneas clear. PERRL, EOM's intact. Fundi benign  Ears: normal TM's and external ear canals AU  Nose: Nares normal. Septum midline. Mucosa normal. No drainage or sinus tenderness. Throat: Lips, mucosa, and tongue normal. Teeth and gums normal  Neck: supple, symmetrical, trachea midline, no adenopathy, thyroid: not enlarged, symmetric, no tenderness/mass/nodules, no carotid bruit and no JVD  Back: symmetric, no curvature. ROM normal. No CVA tenderness. Lungs: rhonchi R apex, L apex  Heart: regular rate and rhythm, S1, S2 normal, no murmur, click, rub or gallop  Abdomen: soft, non-tender. Bowel sounds normal. No masses,  no organomegaly  Extremities: extremities normal, atraumatic, no cyanosis or edema  Skin: Skin color, texture, turgor normal. No rashes or lesions    Spoke with the patient about starting antibiotics pending the final chest xray. Lung sounds with rhonchi that slightly clear with coughing but not completely.     Xray shows opacity, called this patient with the xray results and advised her an antibiotic would be sent over today for her to start. Spoke with patient and advised for her to go to the ER if she begins to have uncontrolled fevers, shortness of breath, difficulty breathing, or continues with malaise and fatigue. Assessment/Plan:       ICD-10-CM ICD-9-CM    1. Cough R05 786.2 XR CHEST PA LAT      benzonatate (TESSALON) 200 mg capsule      azithromycin (ZITHROMAX) 250 mg tablet   2. Viral upper respiratory tract infection J06.9 465.9    3. Chronic nasal congestion R09.81 478.19    4.  Influenza and pneumonia J11.00 487.0

## 2018-12-26 ENCOUNTER — TELEPHONE (OUTPATIENT)
Dept: PRIMARY CARE CLINIC | Age: 48
End: 2018-12-26

## 2019-04-01 ENCOUNTER — HOSPITAL ENCOUNTER (OUTPATIENT)
Dept: MAMMOGRAPHY | Age: 49
Discharge: HOME | End: 2019-04-01
Attending: SURGERY
Payer: COMMERCIAL

## 2019-04-01 DIAGNOSIS — Z12.39 SCREENING BREAST EXAMINATION: ICD-10-CM

## 2019-04-01 PROCEDURE — 77063 BREAST TOMOSYNTHESIS BI: CPT

## 2020-02-03 ENCOUNTER — OFFICE VISIT (OUTPATIENT)
Dept: SURGERY | Age: 50
End: 2020-02-03

## 2020-02-03 VITALS
BODY MASS INDEX: 48.82 KG/M2 | SYSTOLIC BLOOD PRESSURE: 152 MMHG | WEIGHT: 293 LBS | HEIGHT: 65 IN | DIASTOLIC BLOOD PRESSURE: 92 MMHG | HEART RATE: 87 BPM

## 2020-02-03 DIAGNOSIS — Z15.01 MONOALLELIC MUTATION OF CHEK2 GENE IN FEMALE PATIENT: ICD-10-CM

## 2020-02-03 DIAGNOSIS — Z91.89 AT HIGH RISK FOR BREAST CANCER: Primary | ICD-10-CM

## 2020-02-03 DIAGNOSIS — Z15.02 MONOALLELIC MUTATION OF CHEK2 GENE IN FEMALE PATIENT: ICD-10-CM

## 2020-02-03 DIAGNOSIS — Z15.89 MONOALLELIC MUTATION OF CHEK2 GENE IN FEMALE PATIENT: ICD-10-CM

## 2020-02-03 DIAGNOSIS — Z15.09 MONOALLELIC MUTATION OF CHEK2 GENE IN FEMALE PATIENT: ICD-10-CM

## 2020-02-03 DIAGNOSIS — N64.59 BREAST THICKENING: ICD-10-CM

## 2020-02-03 NOTE — PROGRESS NOTES
HISTORY OF PRESENT ILLNESS  Christy Iraheta is a 44 y.o. female. HPI  ESTABLISHED patient here for annual follow up for high risk family history and CHEK2 mutation. She is doing well. Denies any breast lumps or skin changes. No breast pain. Patient has a history of melanoma at age 30.      FH:  Mother had breast cancer at 79. Mother is .  Shelli Bahena grandmother  from ovarian cancer at 50.       Mammogram, 2019, BIRADS 1      Past Medical History:   Diagnosis Date    Cancer [facility])     melanoma       No past surgical history on file. Social History     Socioeconomic History    Marital status:      Spouse name: Not on file    Number of children: Not on file    Years of education: Not on file    Highest education level: Not on file   Occupational History    Not on file   Social Needs    Financial resource strain: Not on file    Food insecurity:     Worry: Not on file     Inability: Not on file    Transportation needs:     Medical: Not on file     Non-medical: Not on file   Tobacco Use    Smoking status: Never Smoker    Smokeless tobacco: Never Used   Substance and Sexual Activity    Alcohol use:  Yes     Alcohol/week: 3.0 standard drinks     Types: 2 Glasses of wine, 1 Cans of beer per week    Drug use: No    Sexual activity: Yes   Lifestyle    Physical activity:     Days per week: Not on file     Minutes per session: Not on file    Stress: Not on file   Relationships    Social connections:     Talks on phone: Not on file     Gets together: Not on file     Attends Yarsani service: Not on file     Active member of club or organization: Not on file     Attends meetings of clubs or organizations: Not on file     Relationship status: Not on file    Intimate partner violence:     Fear of current or ex partner: Not on file     Emotionally abused: Not on file     Physically abused: Not on file     Forced sexual activity: Not on file   Other Topics Concern    Not on file   Social History Narrative    Not on file       Current Outpatient Medications on File Prior to Visit   Medication Sig Dispense Refill    Cetirizine (ZYRTEC) 10 mg cap Take  by mouth daily as needed. Indications: inflammation of the nose due to an allergy      multivitamin (ONE A DAY) tablet Take 1 Tab by mouth daily.  famotidine (PEPCID AC) 10 mg tablet Take 10 mg by mouth two (2) times a day.  calcium carbonate/vitamin D3 (CALCIUM WITH VITAMIN D PO) Take 1 Tab by mouth daily.  [DISCONTINUED] gluc/chnd/om3/dha/epa/fish/str (GLUCOSAMINE CHONDROITIN PLUS PO) Take 1 Tab by mouth daily. No current facility-administered medications on file prior to visit. Allergies   Allergen Reactions    Codeine Nausea and Vomiting       OB History        1    Para        Term                AB        Living           SAB        TAB        Ectopic        Molar        Multiple   1    Live Births   2          Obstetric Comments   Menarche 13, LMP 16, # of children 2, age of 4st delivery 27, Hysterectomy/oophorectomy no/no, Breast bx no, history of breast feeding yes, BCP no, Hormone therapy no             ROS    Physical Exam  Exam conducted with a chaperone present. Cardiovascular:      Rate and Rhythm: Normal rate and regular rhythm. Heart sounds: Normal heart sounds. Pulmonary:      Breath sounds: Normal breath sounds. Chest:      Breasts: Breasts are symmetrical.         Right: Normal. No swelling, bleeding, inverted nipple, mass, nipple discharge, skin change or tenderness. Left: Normal. No swelling, bleeding, inverted nipple, mass, nipple discharge, skin change or tenderness. Lymphadenopathy:      Cervical:      Right cervical: No superficial, deep or posterior cervical adenopathy. Left cervical: No superficial, deep or posterior cervical adenopathy. Upper Body:      Right upper body: No supraclavicular or axillary adenopathy.       Left upper body: No supraclavicular or axillary adenopathy. BREAST ULTRASOUND  Indication: RIGHT breast thickening LOQ  Technique: The area was scanned using a high-frequency linear-array near-field transducer  Findings: No abnormal mass, lesion, or shadowing noted; no cysts; no axillary lymphadenopathy  Impression: Normal breast tissue  Disposition: No worrisome finding on ultrasound      ASSESSMENT and PLAN    ICD-10-CM ICD-9-CM    1. At high risk for breast cancer Z91.89 V49.89 MRI BREAST BI W WO CONT   2. Breast thickening N64.59 611.79    3. Monoallelic mutation of CHEK2 gene in female patient Z15.01 V84.01     Z15.89 V84.09     Z15.09 V84.89     Z15.02 V84.02       Established pt presents for annual high risk screening, and is doing well overall. Thickening on exam at RIGHT breast LOQ, with normal US. Pt reports infiltration during last breast MRI, but is willing to try again; will order baseline MRI and follow up with the results. F/U in 1 year. This plan was reviewed with the patient and patient agrees. All questions were answered.     Written by Mark Kaiser, as dictated by Dr. Epifanio Cuello MD.

## 2020-02-03 NOTE — COMMUNICATION BODY
HISTORY OF PRESENT ILLNESS  Mone Torrez is a 44 y.o. female. HPI  ESTABLISHED patient here for annual follow up for high risk family history and CHEK2 mutation. She is doing well. Denies any breast lumps or skin changes. No breast pain. Patient has a history of melanoma at age 30.      FH:  Mother had breast cancer at 79. Mother is .  Theodor Martin grandmother  from ovarian cancer at 50.       Mammogram, 2019, BIRADS 1      Past Medical History:   Diagnosis Date    Cancer [facility])     melanoma       No past surgical history on file. Social History     Socioeconomic History    Marital status:      Spouse name: Not on file    Number of children: Not on file    Years of education: Not on file    Highest education level: Not on file   Occupational History    Not on file   Social Needs    Financial resource strain: Not on file    Food insecurity:     Worry: Not on file     Inability: Not on file    Transportation needs:     Medical: Not on file     Non-medical: Not on file   Tobacco Use    Smoking status: Never Smoker    Smokeless tobacco: Never Used   Substance and Sexual Activity    Alcohol use:  Yes     Alcohol/week: 3.0 standard drinks     Types: 2 Glasses of wine, 1 Cans of beer per week    Drug use: No    Sexual activity: Yes   Lifestyle    Physical activity:     Days per week: Not on file     Minutes per session: Not on file    Stress: Not on file   Relationships    Social connections:     Talks on phone: Not on file     Gets together: Not on file     Attends Bahai service: Not on file     Active member of club or organization: Not on file     Attends meetings of clubs or organizations: Not on file     Relationship status: Not on file    Intimate partner violence:     Fear of current or ex partner: Not on file     Emotionally abused: Not on file     Physically abused: Not on file     Forced sexual activity: Not on file   Other Topics Concern    Not on file   Social History Narrative    Not on file       Current Outpatient Medications on File Prior to Visit   Medication Sig Dispense Refill    Cetirizine (ZYRTEC) 10 mg cap Take  by mouth daily as needed. Indications: inflammation of the nose due to an allergy      multivitamin (ONE A DAY) tablet Take 1 Tab by mouth daily.  famotidine (PEPCID AC) 10 mg tablet Take 10 mg by mouth two (2) times a day.  calcium carbonate/vitamin D3 (CALCIUM WITH VITAMIN D PO) Take 1 Tab by mouth daily.  [DISCONTINUED] gluc/chnd/om3/dha/epa/fish/str (GLUCOSAMINE CHONDROITIN PLUS PO) Take 1 Tab by mouth daily. No current facility-administered medications on file prior to visit. Allergies   Allergen Reactions    Codeine Nausea and Vomiting       OB History        1    Para        Term                AB        Living           SAB        TAB        Ectopic        Molar        Multiple   1    Live Births   2          Obstetric Comments   Menarche 13, LMP 16, # of children 2, age of 2st delivery 27, Hysterectomy/oophorectomy no/no, Breast bx no, history of breast feeding yes, BCP no, Hormone therapy no             ROS    Physical Exam  Exam conducted with a chaperone present. Cardiovascular:      Rate and Rhythm: Normal rate and regular rhythm. Heart sounds: Normal heart sounds. Pulmonary:      Breath sounds: Normal breath sounds. Chest:      Breasts: Breasts are symmetrical.         Right: Normal. No swelling, bleeding, inverted nipple, mass, nipple discharge, skin change or tenderness. Left: Normal. No swelling, bleeding, inverted nipple, mass, nipple discharge, skin change or tenderness. Lymphadenopathy:      Cervical:      Right cervical: No superficial, deep or posterior cervical adenopathy. Left cervical: No superficial, deep or posterior cervical adenopathy. Upper Body:      Right upper body: No supraclavicular or axillary adenopathy.       Left upper body: No supraclavicular or axillary adenopathy. BREAST ULTRASOUND  Indication: RIGHT breast thickening LOQ  Technique: The area was scanned using a high-frequency linear-array near-field transducer  Findings: No abnormal mass, lesion, or shadowing noted; no cysts; no axillary lymphadenopathy  Impression: Normal breast tissue  Disposition: No worrisome finding on ultrasound      ASSESSMENT and PLAN    ICD-10-CM ICD-9-CM    1. At high risk for breast cancer Z91.89 V49.89 MRI BREAST BI W WO CONT   2. Breast thickening N64.59 611.79    3. Monoallelic mutation of CHEK2 gene in female patient Z15.01 V84.01     Z15.89 V84.09     Z15.09 V84.89     Z15.02 V84.02       Established pt presents for annual high risk screening, and is doing well overall. Thickening on exam at RIGHT breast LOQ, with normal US. Pt reports infiltration during last breast MRI, but is willing to try again; will order baseline MRI and follow up with the results. F/U in 1 year. This plan was reviewed with the patient and patient agrees. All questions were answered.     Written by Wade Ogden, as dictated by Dr. Luis M Posada MD.

## 2020-02-03 NOTE — PATIENT INSTRUCTIONS

## 2020-02-03 NOTE — PROGRESS NOTES
HISTORY OF PRESENT ILLNESS Angi Cho is a 44 y.o. female. HPI  ESTABLISHED patient here for annual follow up for high risk family history and CHEK2 mutation. She is doing well. Denies any breast lumps or skin changes. No breast pain. Patient has a history of melanoma at age 30.  
  
FH:  Mother had breast cancer at 79. Mother is .  Josué Ramos grandmother  from ovarian cancer at 48.   
 
Mammogram, 2019, BIRADS 1 
  
ROS Physical Exam 
 
ASSESSMENT and PLAN 
{ASSESSMENT/PLAN:19529}

## 2020-03-04 ENCOUNTER — TELEPHONE (OUTPATIENT)
Dept: SURGERY | Age: 50
End: 2020-03-04

## 2020-03-04 ENCOUNTER — HOSPITAL ENCOUNTER (OUTPATIENT)
Dept: MRI IMAGING | Age: 50
Discharge: HOME | End: 2020-03-04
Attending: SURGERY
Payer: COMMERCIAL

## 2020-03-04 DIAGNOSIS — Z91.89 AT HIGH RISK FOR BREAST CANCER: ICD-10-CM

## 2020-03-04 PROCEDURE — 74011000258 HC RX REV CODE- 258: Performed by: SURGERY

## 2020-03-04 PROCEDURE — A9585 GADOBUTROL INJECTION: HCPCS | Performed by: SURGERY

## 2020-03-04 PROCEDURE — 74011250636 HC RX REV CODE- 250/636: Performed by: SURGERY

## 2020-03-04 PROCEDURE — 77049 MRI BREAST C-+ W/CAD BI: CPT

## 2020-03-04 RX ORDER — SODIUM CHLORIDE 0.9 % (FLUSH) 0.9 %
10 SYRINGE (ML) INJECTION
Status: COMPLETED | OUTPATIENT
Start: 2020-03-04 | End: 2020-03-04

## 2020-03-04 RX ADMIN — Medication 10 ML: at 14:45

## 2020-03-04 RX ADMIN — GADOBUTROL 13 ML: 604.72 INJECTION INTRAVENOUS at 14:45

## 2020-03-04 RX ADMIN — SODIUM CHLORIDE 100 ML: 900 INJECTION, SOLUTION INTRAVENOUS at 14:46

## 2020-08-06 ENCOUNTER — EMPLOYEE WELLNESS (OUTPATIENT)
Dept: OTHER | Facility: CLINIC | Age: 50
End: 2020-08-06

## 2020-08-06 LAB
CHOLEST SERPL-MCNC: 198 MG/DL
GLUCOSE SERPL-MCNC: 107 MG/DL (ref 65–100)
HDLC SERPL-MCNC: 54 MG/DL
LDLC SERPL CALC-MCNC: 128.4 MG/DL (ref 0–100)
TRIGL SERPL-MCNC: 78 MG/DL (ref ?–150)

## 2020-08-26 ENCOUNTER — OFFICE VISIT (OUTPATIENT)
Dept: URGENT CARE | Age: 50
End: 2020-08-26

## 2020-08-26 VITALS — HEART RATE: 72 BPM | RESPIRATION RATE: 16 BRPM | TEMPERATURE: 97.6 F | OXYGEN SATURATION: 96 %

## 2020-08-26 DIAGNOSIS — Z11.59 SCREENING FOR VIRAL DISEASE: Primary | ICD-10-CM

## 2020-08-26 PROCEDURE — 99203 OFFICE O/P NEW LOW 30 MIN: CPT | Performed by: NURSE PRACTITIONER

## 2020-08-26 NOTE — PROGRESS NOTES
The history is provided by the patient. Patient presenting to [facility] for COVID testing. Patient complains of sore throat, runny nose, cough and headache. Patient denies fever or SOB. Patient was exposed to a positive COVID person. Past Medical History:   Diagnosis Date    Cancer [facility])     melanoma        History reviewed. No pertinent surgical history. Family History   Problem Relation Age of Onset    Breast Cancer Mother 67    Diabetes Mother     Stroke Mother     Heart Disease Father     Stroke Father     Ovarian Cancer Maternal Grandmother     No Known Problems Sister     No Known Problems Brother         Social History     Socioeconomic History    Marital status:      Spouse name: Not on file    Number of children: Not on file    Years of education: Not on file    Highest education level: Not on file   Occupational History    Not on file   Social Needs    Financial resource strain: Not on file    Food insecurity     Worry: Not on file     Inability: Not on file    Transportation needs     Medical: Not on file     Non-medical: Not on file   Tobacco Use    Smoking status: Never Smoker    Smokeless tobacco: Never Used   Substance and Sexual Activity    Alcohol use:  Yes     Alcohol/week: 3.0 standard drinks     Types: 2 Glasses of wine, 1 Cans of beer per week    Drug use: No    Sexual activity: Yes   Lifestyle    Physical activity     Days per week: Not on file     Minutes per session: Not on file    Stress: Not on file   Relationships    Social connections     Talks on phone: Not on file     Gets together: Not on file     Attends Uatsdin service: Not on file     Active member of club or organization: Not on file     Attends meetings of clubs or organizations: Not on file     Relationship status: Not on file    Intimate partner violence     Fear of current or ex partner: Not on file     Emotionally abused: Not on file     Physically abused: Not on file     Forced sexual activity: Not on file   Other Topics Concern    Not on file   Social History Narrative    Not on file                ALLERGIES: Codeine    Review of Systems   Constitutional: Negative. HENT: Positive for rhinorrhea and sore throat. Respiratory: Positive for cough. Gastrointestinal: Negative. Musculoskeletal: Negative. Neurological: Positive for headaches. Vitals:    08/26/20 1553   Pulse: 72   Resp: 16   Temp: 97.6 °F (36.4 °C)   SpO2: 96%       Physical Exam  Constitutional:       Appearance: Normal appearance. She is well-developed. Cardiovascular:      Rate and Rhythm: Normal rate. Pulses: Normal pulses. Pulmonary:      Effort: Pulmonary effort is normal.   Neurological:      Mental Status: She is alert and oriented to person, place, and time. Psychiatric:         Mood and Affect: Mood normal.         MDM     Differential Diagnosis; Clinical Impression; Plan:     (Z11.59) Screening for viral disease  (primary encounter diagnosis)  No orders of the defined types were placed in this encounter. Tested patient for COVID-19. Patient given education material.  The condition was discussed with the patient and they understand. If symptoms worsen the pt is to go to the ER. Advised patient to take tylenol for discomfort. Advised to quarantine self. This patient was seen in [facility] at [address] [place] [address] [facility]gent [facility] while in their vehicle due to COVID-19 pandemic with PPE and focused examination in order to decrease community viral transmission. The patient/guardian gave verbal consent to treat.       Procedures

## 2020-08-29 LAB — SARS-COV-2, NAA: DETECTED

## 2020-08-29 NOTE — PROGRESS NOTES
Patient Notified for positive Covid-19  Asymptomaticcurrently  Follow quarantine guideline as per CDC  Notify contacts to be tested if symptomatic    Advised to follow with PCP and go to ED if worsen

## 2020-10-22 ENCOUNTER — HOSPITAL ENCOUNTER (OUTPATIENT)
Dept: MAMMOGRAPHY | Age: 50
Discharge: HOME | End: 2020-10-22
Attending: SURGERY
Payer: COMMERCIAL

## 2020-10-22 DIAGNOSIS — Z12.31 BREAST CANCER SCREENING BY MAMMOGRAM: ICD-10-CM

## 2020-10-22 PROCEDURE — 77063 BREAST TOMOSYNTHESIS BI: CPT

## 2021-02-01 ENCOUNTER — OFFICE VISIT (OUTPATIENT)
Dept: SURGERY | Age: 51
End: 2021-02-01
Payer: COMMERCIAL

## 2021-02-01 VITALS
BODY MASS INDEX: 48.82 KG/M2 | HEART RATE: 86 BPM | WEIGHT: 293 LBS | DIASTOLIC BLOOD PRESSURE: 64 MMHG | HEIGHT: 65 IN | TEMPERATURE: 97.2 F | SYSTOLIC BLOOD PRESSURE: 129 MMHG

## 2021-02-01 DIAGNOSIS — Z15.01 MONOALLELIC MUTATION OF CHEK2 GENE IN FEMALE PATIENT: ICD-10-CM

## 2021-02-01 DIAGNOSIS — Z15.09 MONOALLELIC MUTATION OF CHEK2 GENE IN FEMALE PATIENT: ICD-10-CM

## 2021-02-01 DIAGNOSIS — Z15.89 MONOALLELIC MUTATION OF CHEK2 GENE IN FEMALE PATIENT: ICD-10-CM

## 2021-02-01 DIAGNOSIS — Z15.02 MONOALLELIC MUTATION OF CHEK2 GENE IN FEMALE PATIENT: ICD-10-CM

## 2021-02-01 DIAGNOSIS — Z91.89 AT HIGH RISK FOR BREAST CANCER: Primary | ICD-10-CM

## 2021-02-01 PROCEDURE — 99213 OFFICE O/P EST LOW 20 MIN: CPT | Performed by: SURGERY

## 2021-02-01 NOTE — PROGRESS NOTES
HISTORY OF PRESENT ILLNESS Mecca Dunne is a 44 y.o. female. HPI   ESTABLISHED patient here for follow-up as she is at high risk for breast cancer. Has a CHEK 2 mutation as well as a FH of breast/ovarian cancer. She is not feeling any breast lumps, has no pain, no nipple or skin changes. Mammogram and MRI done last year were both normal.   
[facility]2 heterozygous [facility]. Patient has a history of melanoma at age 30.   
FH:  Mother had breast cancer at 79. Mother is . Was CHEK 2 positive. Maternal grandmother  from ovarian cancer at 48. VICKEY Results (most recent): 
Results from Hospital Encounter encounter on 10/22/20 VICKEY 3D TREVOR W MAMMO BI SCREENING INCL CAD Narrative STUDY: Bilateral digital screening mammogram with 3-D tomosynthesis INDICATION:  Screening. COMPARISON: 3/4/2020 through 10/26/2012. BREAST COMPOSITION: There are scattered areas of fibroglandular density. FINDINGS: Bilateral digital screening mammography was performed and is 
interpreted in conjunction with a computer assisted detection (CAD) system. Additionally, tomosynthesis of both breasts in the CC and MLO projections was 
performed. No suspicious masses or calcifications are identified. There has been 
no significant change. Impression IMPRESSION: 
BI-RADS 1: Negative. No mammographic evidence of malignancy. RECOMMENDATIONS: 
Next screening mammogram is recommended in one year. The patient will be notified of these results. MRI Results (most recent): 
Results from Hospital Encounter encounter on 20 MRI BREAST BI W WO CONT Narrative INDICATION: High risk breast cancer screening. COMPARISON: Mammogram from 2019 TECHNIQUE: 
Bilateral breast MRI was performed using a dedicated breast coil without 
compression with the patient in the prone position. Precontrast T1-weighted images with fat suppression were obtained followed by bolus injection of 13 mL Gadavist. Postcontrast dynamic and high-resolution images were acquired. T2-weighted axial imaging with fat suppression was also performed. The images 
were analyzed using CAD analysis, enhancement curves, digital subtraction, and 2 
and 3 dimensional reconstructions. FINDINGS: 
The breasts demonstrate scattered fibroglandular densities. There is mild 
background parenchymal enhancement. No dominant mass lesion or suspicious nonmasslike enhancement is seen in either 
breast. There are no pathologically enlarged axillary or internal mammary lymph 
nodes. Impression IMPRESSION: 
BI-RADS Assessment Category 1: Negative. There is no MR evidence of malignancy. A negative breast MRI examination speaks strongly against invasive cancer down 
to a detection threshold of 3 to 5 mm but may not detect some lower grade or in 
situ carcinomas. Therefore, routine clinical and mammographic followup are 
recommended. A summary portfolio has been created in PACS. ROS Physical Exam 
 
ASSESSMENT and PLAN 
{ASSESSMENT/PLAN:17788}

## 2021-02-01 NOTE — PROGRESS NOTES
HISTORY OF PRESENT ILLNESS  Philomena Neff is a 44 y.o. female. HPI  ESTABLISHED patient here for follow-up as she is at high risk for breast cancer. Has a CHEK 2 mutation as well as a FH of breast/ovarian cancer. She is not feeling any breast lumps, has no pain, no nipple or skin changes. Mammogram and MRI done last year were both normal.      18: [facility] genetic testing: CHEK2 heterozygous mutation. Patient has a history of melanoma at age 30.      FH:  Mother had breast cancer at 79. Mother is . Was CHEK 2 positive. Maternal grandmother  from ovarian cancer at 48. [facility] Results (most recent):       Results from [place] encounter on 10/22/20   VICKEY 3D TREVOR W MAMMO BI SCREENING INCL CAD     Narrative STUDY: Bilateral digital screening mammogram with 3-D tomosynthesis     INDICATION:  Screening.     COMPARISON: 3/4/2020 through 10/26/2012.     BREAST COMPOSITION: There are scattered areas of fibroglandular density.     FINDINGS: Bilateral digital screening mammography was performed and is  interpreted in conjunction with a computer assisted detection (CAD) system. Additionally, tomosynthesis of both breasts in the CC and MLO projections was  performed. No suspicious masses or calcifications are identified. There has been  no significant change.        Impression IMPRESSION:  BI-RADS 1: Negative. No mammographic evidence of malignancy.      RECOMMENDATIONS:  Next screening mammogram is recommended in one year.      The patient will be notified of these results.         MRI Results (most recent):       Results from [place] encounter on 20   MRI BREAST BI W WO CONT     Narrative INDICATION: High risk breast cancer screening.     COMPARISON: Mammogram from 2019     TECHNIQUE:  Bilateral breast MRI was performed using a dedicated breast coil without  compression with the patient in the prone position.  Precontrast T1-weighted  images with fat suppression were obtained followed by bolus injection of 13 mL  Gadavist. Postcontrast dynamic and high-resolution images were acquired. T2-weighted axial imaging with fat suppression was also performed. The images  were analyzed using CAD analysis, enhancement curves, digital subtraction, and 2  and 3 dimensional reconstructions.     FINDINGS:  The breasts demonstrate scattered fibroglandular densities. There is mild  background parenchymal enhancement.     No dominant mass lesion or suspicious nonmasslike enhancement is seen in either  breast. There are no pathologically enlarged axillary or internal mammary lymph  nodes.        Impression IMPRESSION:  BI-RADS Assessment Category 1: Negative. There is no MR evidence of malignancy.     A negative breast MRI examination speaks strongly against invasive cancer down  to a detection threshold of 3 to 5 mm but may not detect some lower grade or in  situ carcinomas. Therefore, routine clinical and mammographic followup are  recommended.     A summary portfolio has been created in PACS. Past Medical History:   Diagnosis Date    Cancer [facility])     melanoma       No past surgical history on file. Social History     Socioeconomic History    Marital status:      Spouse name: Not on file    Number of children: Not on file    Years of education: Not on file    Highest education level: Not on file   Occupational History    Not on file   Social Needs    Financial resource strain: Not on file    Food insecurity     Worry: Not on file     Inability: Not on file    Transportation needs     Medical: Not on file     Non-medical: Not on file   Tobacco Use    Smoking status: Never Smoker    Smokeless tobacco: Never Used   Substance and Sexual Activity    Alcohol use:  Yes     Alcohol/week: 3.0 standard drinks     Types: 2 Glasses of wine, 1 Cans of beer per week    Drug use: No    Sexual activity: Yes   Lifestyle    Physical activity     Days per week: Not on file Minutes per session: Not on file    Stress: Not on file   Relationships    Social connections     Talks on phone: Not on file     Gets together: Not on file     Attends [employer] service: Not on file     Active member of club or organization: Not on file     Attends meetings of clubs or organizations: Not on file     Relationship status: Not on file    Intimate partner violence     Fear of current or ex partner: Not on file     Emotionally abused: Not on file     Physically abused: Not on file     Forced sexual activity: Not on file   Other Topics Concern    Not on file   Social History Narrative    Not on file       Current Outpatient Medications on File Prior to Visit   Medication Sig Dispense Refill    Cetirizine (ZYRTEC) 10 mg cap Take  by mouth daily as needed. Indications: inflammation of the nose due to an allergy      multivitamin (ONE A DAY) tablet Take 1 Tab by mouth daily.  famotidine (PEPCID AC) 10 mg tablet Take 10 mg by mouth two (2) times a day.  calcium carbonate/vitamin D3 (CALCIUM WITH VITAMIN D PO) Take 1 Tab by mouth daily. No current facility-administered medications on file prior to visit. Allergies   Allergen Reactions    Codeine Nausea and Vomiting       OB History        1    Para        Term                AB        Living           SAB        TAB        Ectopic        Molar        Multiple   1    Live Births   2          Obstetric Comments   Menarche 13, LMP 16, # of children 2, age of 4st delivery 27, Hysterectomy/oophorectomy no/no, Breast bx no, history of breast feeding yes, BCP no, Hormone therapy no             ROS    Physical Exam  Exam conducted with a chaperone present. Cardiovascular:      Rate and Rhythm: Normal rate and regular rhythm. Heart sounds: Normal heart sounds. Pulmonary:      Breath sounds: Normal breath sounds.    Chest:      Breasts: Breasts are symmetrical.         Right: Normal. No swelling, bleeding, inverted nipple, mass, nipple discharge, skin change or tenderness. Left: Normal. No swelling, bleeding, inverted nipple, mass, nipple discharge, skin change or tenderness. Lymphadenopathy:      Cervical:      Right cervical: No superficial, deep or posterior cervical adenopathy. Left cervical: No superficial, deep or posterior cervical adenopathy. Upper Body:      Right upper body: No supraclavicular or axillary adenopathy. Left upper body: No supraclavicular or axillary adenopathy. ASSESSMENT and PLAN    ICD-10-CM ICD-9-CM    1. At high risk for breast cancer  Z91.89 V49.89    2. Monoallelic mutation of CHEK2 gene in female patient  Z15.01 V84.01     Z15.89 V84.09     Z15.09 V84.89     Z15.02 V84.02       Established pt presents for annual high risk screening, and is doing well overall. Physical exam today normal. Discussed continued high risk screening with physical exam here and with OB-GYN, and annual mammography. Pt does not need an annual breast MRI. F/U in 1 year. This plan was reviewed with the patient and patient agrees. All questions were answered. Total time spent was 20 minutes.     Written by Rosalia Hogan, as dictated by Dr. Tianna Quinn MD.

## 2021-02-01 NOTE — PATIENT INSTRUCTIONS
MRI of the Breast: About This Test  What is it? MRI (magnetic resonance imaging) is a test that uses a magnetic field and pulses of radio wave energy to make pictures of the organs and structures inside the body. An MRI can give your doctor information about your breasts, chest wall, and underarm. When you have an MRI, you lie on a table and the table moves into the MRI machine. Why is this test done? An MRI of the breast can help find breast cancer and how far along it is (its stage). It can also look for infection. How do you prepare for the test?  In general, there's nothing you have to do before this test, unless your doctor tells you to. Tell your doctor if you get nervous in tight spaces. You may get a medicine to help you relax. If you think you'll get this medicine, be sure you have someone to take you home. How is the test done? · You may have contrast material (dye) put into your arm through a tube called an IV. · You will lie on a table that's part of the MRI scanner. · The table will slide into the space that contains the magnet. · Inside the scanner, you will hear a fan and feel air moving. You may hear tapping, thumping, or snapping noises. You may be given earplugs or headphones to reduce the noise. · You will be asked to hold still during the scan. You may be asked to hold your breath for short periods. · You may be alone in the scanning room. But a technologist will watch through a window and talk with you during the test.  How does having an MRI of the breast feel? You won't have pain from the magnetic field or radio waves used for the MRI test. But you may be tired or sore from lying in one position for a long time. If a contrast material is used, you may feel some coolness when it is put into your IV. In rare cases, you may feel:  · Tingling in the mouth if you have metal dental fillings. · Warmth in the area being checked.  This is normal. Tell the technologist if you have nausea, vomiting, a headache, dizziness, pain, burning, or breathing problems. How long does the test take? The test usually takes 30 to 60 minutes but can take as long as 2 hours. What are the risks of an MRI of the breast?  There are no known harmful effects from the strong magnetic field used for an MRI. But the magnet is very powerful. It may affect any metal implants or other medical devices you have. An MRI may be more likely than other tests to report a problem in the breast when a problem is not there (false-positive). A false-positive result may lead to more tests such as a biopsy when no serious problem is really present. So MRI is not used as a screening test for women at low or average risk for breast cancer. Risks from contrast material  Contrast material that contains gadolinium may be used in this test. But for most people, the benefit of its use in this test outweighs the risk. Be sure to tell your doctor if you have kidney problems or are pregnant. There is a slight chance of an allergic reaction if contrast material is used during the test. But most reactions are mild and can be treated using medicine. If you breastfeed and are concerned about whether the contrast material used in this test is safe, talk to your doctor. Most experts believe that very little dye passes into breast milk and even less is passed on to the baby. But if you are concerned, you can stop breastfeeding for up to 24 hours after the test. During this time, you can give your baby breast milk that you stored before the test. Don't use the breast milk you pump in the 24 hours after the test. Throw it out. What happens after the test?  · You will probably be able to go home right away. It depends on the reason for the test.  · You can go back to your usual activities right away. Follow-up care is a key part of your treatment and safety.  Be sure to make and go to all appointments, and call your doctor if you are having problems. It's also a good idea to keep a list of the medicines you take. Ask your doctor when you can expect to have your test results. Where can you learn more? Go to http://www.gray.com/  Enter T611 in the search box to learn more about \"MRI of the Breast: About This Test.\"  Current as of: December 9, 2019               Content Version: 12.6  © 8111-3970 [email or web address]. Care instructions adapted under license by [employer] (which disclaims liability or warranty for this information). If you have questions about a medical condition or this instruction, always ask your healthcare professional. [address] any warranty or liability for your use of this information.

## 2022-03-19 PROBLEM — E66.01 OBESITY, MORBID (HCC): Status: ACTIVE | Noted: 2018-08-27

## 2022-03-20 PROBLEM — Z15.09 MONOALLELIC MUTATION OF CHEK2 GENE IN FEMALE PATIENT: Status: ACTIVE | Noted: 2018-02-22

## 2022-03-20 PROBLEM — Z15.89 MONOALLELIC MUTATION OF CHEK2 GENE IN FEMALE PATIENT: Status: ACTIVE | Noted: 2018-02-22

## 2022-03-20 PROBLEM — Z15.02 MONOALLELIC MUTATION OF CHEK2 GENE IN FEMALE PATIENT: Status: ACTIVE | Noted: 2018-02-22

## 2022-03-20 PROBLEM — Z15.01 MONOALLELIC MUTATION OF CHEK2 GENE IN FEMALE PATIENT: Status: ACTIVE | Noted: 2018-02-22

## 2022-05-18 ENCOUNTER — TRANSCRIBE ORDER (OUTPATIENT)
Dept: SCHEDULING | Age: 52
End: 2022-05-18

## 2022-05-18 DIAGNOSIS — Z12.31 SCREENING MAMMOGRAM FOR HIGH-RISK PATIENT: Primary | ICD-10-CM

## 2022-06-02 ENCOUNTER — HOSPITAL ENCOUNTER (OUTPATIENT)
Dept: MAMMOGRAPHY | Age: 52
Discharge: HOME | End: 2022-06-02
Attending: SURGERY
Payer: COMMERCIAL

## 2022-06-02 DIAGNOSIS — Z12.31 SCREENING MAMMOGRAM FOR HIGH-RISK PATIENT: ICD-10-CM

## 2022-06-02 PROCEDURE — 77063 BREAST TOMOSYNTHESIS BI: CPT

## 2022-08-04 LAB
CHOLEST SERPL-MCNC: 200 MG/DL
GLUCOSE SERPL-MCNC: 114 MG/DL (ref 65–100)
HDLC SERPL-MCNC: 51 MG/DL
LDLC SERPL CALC-MCNC: 122.8 MG/DL (ref 0–100)
TRIGL SERPL-MCNC: 131 MG/DL (ref ?–150)

## 2023-07-06 ENCOUNTER — HOSPITAL ENCOUNTER (OUTPATIENT)
Facility: HOSPITAL | Age: 53
Discharge: HOME | End: 2023-07-06
Attending: SURGERY
Payer: COMMERCIAL

## 2023-07-06 DIAGNOSIS — Z12.31 VISIT FOR SCREENING MAMMOGRAM: ICD-10-CM

## 2023-07-06 PROCEDURE — 77063 BREAST TOMOSYNTHESIS BI: CPT

## 2024-04-08 ENCOUNTER — OFFICE VISIT (OUTPATIENT)
Age: 54
End: 2024-04-08
Payer: COMMERCIAL

## 2024-04-08 VITALS — BODY MASS INDEX: 36.65 KG/M2 | WEIGHT: 220 LBS | HEIGHT: 65 IN

## 2024-04-08 DIAGNOSIS — Z15.89 MONOALLELIC MUTATION OF CHEK2 GENE IN FEMALE PATIENT: Primary | ICD-10-CM

## 2024-04-08 DIAGNOSIS — Z15.09 MONOALLELIC MUTATION OF CHEK2 GENE IN FEMALE PATIENT: Primary | ICD-10-CM

## 2024-04-08 DIAGNOSIS — Z15.01 MONOALLELIC MUTATION OF CHEK2 GENE IN FEMALE PATIENT: Primary | ICD-10-CM

## 2024-04-08 DIAGNOSIS — Z15.02 MONOALLELIC MUTATION OF CHEK2 GENE IN FEMALE PATIENT: Primary | ICD-10-CM

## 2024-04-08 PROCEDURE — 99213 OFFICE O/P EST LOW 20 MIN: CPT | Performed by: SURGERY

## 2024-04-08 NOTE — PROGRESS NOTES
HISTORY OF PRESENT ILLNESS  Yany Mario is a 53 y.o. female     HPI ESTABLISHED Patient here for follow up gene mutation. Denies pain or changes to the breast area.       02/22/18: [facility] genetic testing: CHEK2 heterozygous mutation.     Breast imaging-   Mammogram Result (most recent):  RUBÉN TAY DIGITAL SCREEN BILATERAL 07/06/2023    Narrative  STUDY: Bilateral digital screening mammogram with 3-D tomosynthesis    INDICATION:  Screening.    COMPARISON: [id number]    BREAST COMPOSITION: There are scattered areas of fibroglandular density.    FINDINGS: Bilateral digital screening mammography was performed and is  interpreted in conjunction with a computer assisted detection (CAD) system.  Additionally, tomosynthesis of both breasts in the CC and MLO projections was  performed. No suspicious masses or calcifications are identified. There has been  no significant change.    Impression  BI-RADS 1: Negative. No mammographic evidence of malignancy.    RECOMMENDATIONS:  Next screening mammogram is recommended in one year.    The patient will be notified of these results.          Review of Systems      Physical Exam       ASSESSMENT and PLAN  {Assessment and Plan Chronic Disease:[id number]}

## 2024-04-08 NOTE — PROGRESS NOTES
HISTORY OF PRESENT ILLNESS  Yany Mario is a 53 y.o. female.    HPI  ESTABLISHED Patient here for follow up gene mutation. Denies pain or changes to the breast area.      02/22/18: [facility] genetic testing: CHEK2 heterozygous mutation.      Breast imaging-   Mammogram Result (most recent):  RUB[facility]N TAY DIGITAL SCREEN BILATERAL 07/06/2023     Narrative  STUDY: Bilateral digital screening mammogram with 3-D tomosynthesis     INDICATION:  Screening.     COMPARISON: [id number]     BREAST COMPOSITION: There are scattered areas of fibroglandular density.     FINDINGS: Bilateral digital screening mammography was performed and is  interpreted in conjunction with a computer assisted detection (CAD) system.  Additionally, tomosynthesis of both breasts in the CC and MLO projections was  performed. No suspicious masses or calcifications are identified. There has been  no significant change.     Impression  BI-RADS 1: Negative. No mammographic evidence of malignancy.     RECOMMENDATIONS:  Next screening mammogram is recommended in one year.     The patient will be notified of these results.        Past Medical History:   Diagnosis Date    Cancer (HCC)     melanoma       No past surgical history on file.    Social History     Socioeconomic History    Marital status:      Spouse name: Not on file    Number of children: Not on file    Years of education: Not on file    Highest education level: Not on file   Occupational History    Not on file   Tobacco Use    Smoking status: Never    Smokeless tobacco: Never   Substance and Sexual Activity    Alcohol use: Yes     Alcohol/week: 3.0 standard drinks of alcohol    Drug use: No    Sexual activity: Not on file   Other Topics Concern    Not on file   Social History Narrative    Not on file     Social Determinants of Health     Financial Resource Strain: Not on file   Food Insecurity: Not on file   Transportation Needs: Not on file   Physical Activity: Not on file   Stress: Not on file

## 2024-06-10 ENCOUNTER — TRANSCRIBE ORDERS (OUTPATIENT)
Facility: HOSPITAL | Age: 54
End: 2024-06-10

## 2024-06-10 DIAGNOSIS — Z12.31 VISIT FOR SCREENING MAMMOGRAM: Primary | ICD-10-CM

## 2024-07-08 ENCOUNTER — HOSPITAL ENCOUNTER (OUTPATIENT)
Facility: HOSPITAL | Age: 54
Discharge: HOME | End: 2024-07-11
Attending: SURGERY
Payer: COMMERCIAL

## 2024-07-08 DIAGNOSIS — Z12.31 VISIT FOR SCREENING MAMMOGRAM: ICD-10-CM

## 2024-07-08 PROCEDURE — 77063 BREAST TOMOSYNTHESIS BI: CPT

## 2025-05-07 LAB
CHOLEST SERPL-MCNC: 185 MG/DL
GLUCOSE SERPL-MCNC: 108 MG/DL (ref 65–100)
HDLC SERPL-MCNC: 47 MG/DL
LDLC SERPL CALC-MCNC: 113.6 MG/DL (ref 0–100)
TRIGL SERPL-MCNC: 122 MG/DL